# Patient Record
Sex: MALE | Race: WHITE | NOT HISPANIC OR LATINO | URBAN - METROPOLITAN AREA
[De-identification: names, ages, dates, MRNs, and addresses within clinical notes are randomized per-mention and may not be internally consistent; named-entity substitution may affect disease eponyms.]

---

## 2024-01-13 ENCOUNTER — OFFICE VISIT (OUTPATIENT)
Dept: URGENT CARE | Facility: CLINIC | Age: 24
End: 2024-01-13
Payer: COMMERCIAL

## 2024-01-13 VITALS
HEART RATE: 72 BPM | RESPIRATION RATE: 16 BRPM | TEMPERATURE: 99.6 F | DIASTOLIC BLOOD PRESSURE: 72 MMHG | OXYGEN SATURATION: 98 % | SYSTOLIC BLOOD PRESSURE: 128 MMHG

## 2024-01-13 DIAGNOSIS — J32.9 SINOBRONCHITIS: Primary | ICD-10-CM

## 2024-01-13 DIAGNOSIS — J40 SINOBRONCHITIS: Primary | ICD-10-CM

## 2024-01-13 PROCEDURE — 99203 OFFICE O/P NEW LOW 30 MIN: CPT | Performed by: FAMILY MEDICINE

## 2024-01-13 RX ORDER — AZITHROMYCIN 250 MG/1
TABLET, FILM COATED ORAL
Qty: 6 TABLET | Refills: 0 | Status: SHIPPED | OUTPATIENT
Start: 2024-01-13 | End: 2024-01-18

## 2024-01-13 ASSESSMENT — PAIN SCALES - GENERAL: PAINLEVEL: 0-NO PAIN

## 2024-01-13 NOTE — PATIENT INSTRUCTIONS
Drink plenty of fluids to help keep your mucus thin.  Apply moist heat (using a hot, damp towel) to your face for 5 to 10 minutes, several times a day.  Breathe warm, moist air from a steamy shower, a hot bath, or a sink filled with hot water. You can drink warm drinks such as tea.   Avoid extremely cool, dry air. Consider using a humidifier to increase the moisture in the air in your home.  Use saltwater nasal washes(saline irrigation) to help keep the nasal passages open and wash out mucus and bacteria.    You may use over-the-counter anti-inflammatory such as ibuprofen, Advil, Aleve for pain. For inflammation, ibuprofen doses include 400-600 mg 2-3 times per day. Tylenol is acceptable to use for pain.    Cough Suppressants or expectorants may be taken over-the-counter if you are having cough with your symptoms. These include Delsym, Robitussin-DM,    You will be started on antibiotic which will be sent to the pharmacy; please complete the regimen as directed even if your symptoms improve. It is recommended to take an Probiotic while on this medication to reduce symptoms of upset stomach, diarrhea.       Start taking a nasal steroid which may include: Flonase, Nasacort, or Nasonex and use as directed. These medications are now available over the counter.

## 2024-01-13 NOTE — PROGRESS NOTES
Subjective   Patient ID: Obed Herrera is a 23 y.o. male.    HPI    The following portions of the chart were reviewed this encounter and updated as appropriate:       Cough for the past 1.5 weeks.  No fever or chills.  No sick contacts.  No chest congestion.  Cough has been somewhat productive.  Started using cough medicine with some improvement.  No history of asthma or bronchitis.  Congestion in the nose.  No ear pain or headache.  No GI symptoms.  No other complaints or symptoms.  Review of Systems  Objective   Physical Exam  Constitutional: vital signs reviewed. Well developed, well nourished. patient alert and patient without distress.   Head and Face: Normal and atraumatic.    Ears, Nose, Mouth, and Throat:   Hearing: Normal.  External inspection of nose: Normal.   Lips, teeth, tongue and gums: Normal and well hydrated. External inspection of ears: Normal. Ear canals and TMs: Normal.  Posterior pharynx moist and with post nasal drip.  Nasal mucosa: Congested, inflamed  Neck: No neck mass was observed. Supple. normal muscle tone.   Cardiovascular: Heart rate normal, normal S1 and S2, no gallops, no murmurs and no pericardial rub. Rhythm: Normal.  Pulmonary: No respiratory distress. Palpation of chest: Normal. Clear bilateral breath sounds.   Lymphatic: No cervical lymphadenopathy  Psych: Normal mood and affect  Procedures    Assessment/Plan

## 2024-03-22 ENCOUNTER — OFFICE VISIT (OUTPATIENT)
Dept: URGENT CARE | Facility: CLINIC | Age: 24
End: 2024-03-22
Payer: COMMERCIAL

## 2024-03-22 VITALS
OXYGEN SATURATION: 96 % | BODY MASS INDEX: 27.37 KG/M2 | WEIGHT: 180 LBS | SYSTOLIC BLOOD PRESSURE: 116 MMHG | RESPIRATION RATE: 16 BRPM | TEMPERATURE: 98.5 F | DIASTOLIC BLOOD PRESSURE: 70 MMHG | HEART RATE: 70 BPM

## 2024-03-22 DIAGNOSIS — R30.0 DYSURIA: ICD-10-CM

## 2024-03-22 LAB
POC APPEARANCE, URINE: CLEAR
POC BILIRUBIN, URINE: ABNORMAL
POC BLOOD, URINE: NEGATIVE
POC COLOR, URINE: YELLOW
POC GLUCOSE, URINE: NEGATIVE MG/DL
POC KETONES, URINE: ABNORMAL MG/DL
POC LEUKOCYTES, URINE: NEGATIVE
POC NITRITE,URINE: NEGATIVE
POC PH, URINE: 6.5 PH
POC PROTEIN, URINE: ABNORMAL MG/DL
POC SPECIFIC GRAVITY, URINE: >=1.03
POC UROBILINOGEN, URINE: 1 EU/DL

## 2024-03-22 PROCEDURE — 81002 URINALYSIS NONAUTO W/O SCOPE: CPT | Performed by: PHYSICIAN ASSISTANT

## 2024-03-22 PROCEDURE — 87661 TRICHOMONAS VAGINALIS AMPLIF: CPT | Mod: 59 | Performed by: PHYSICIAN ASSISTANT

## 2024-03-22 PROCEDURE — 87086 URINE CULTURE/COLONY COUNT: CPT

## 2024-03-22 PROCEDURE — 99213 OFFICE O/P EST LOW 20 MIN: CPT | Performed by: PHYSICIAN ASSISTANT

## 2024-03-22 PROCEDURE — 1036F TOBACCO NON-USER: CPT | Performed by: PHYSICIAN ASSISTANT

## 2024-03-22 PROCEDURE — 87800 DETECT AGNT MULT DNA DIREC: CPT | Performed by: PHYSICIAN ASSISTANT

## 2024-03-22 ASSESSMENT — ENCOUNTER SYMPTOMS
DYSURIA: 1
FREQUENCY: 1
ABDOMINAL PAIN: 1

## 2024-03-22 ASSESSMENT — PAIN SCALES - GENERAL: PAINLEVEL: 5

## 2024-03-22 NOTE — PROGRESS NOTES
Subjective   Patient ID: Obed Herrera is a 24 y.o. male.    Patient is a 24-year-old male who complains of dysuria and urinary frequency that he has been experiencing for the past 3 days.  Patient also reports suprapubic cramping.  Patient denies fever, chills, flank pain, hematuria, nausea or other symptoms.  Patient denies urethral discharge or new rash to the skin of his genitalia.  Patient states he has no history of prostate issues and is experiencing no associated testicular pain.  Patient is extremely physically fit and has no history of diabetes.  Patient denies nausea, vomiting or diarrhea and states that he has no history of diverticulitis, irritable bowel syndrome or other GI conditions.  Patient also reports no history of kidney stone.      Difficulty Urinating  Presenting symptoms: dysuria    Associated symptoms: abdominal pain and urinary frequency    Abdominal Pain  Associated symptoms include dysuria and frequency.   The following portions of the chart were reviewed this encounter and updated as appropriate:       Review of Systems   Gastrointestinal:  Positive for abdominal pain.   Genitourinary:  Positive for dysuria and frequency.   All other systems reviewed and are negative.  Objective   Physical Exam  Vitals and nursing note reviewed.   Constitutional:       Appearance: Normal appearance. He is normal weight.   HENT:      Head: Normocephalic.      Nose: Nose normal.      Mouth/Throat:      Mouth: Mucous membranes are moist.      Pharynx: Oropharynx is clear.   Eyes:      Extraocular Movements: Extraocular movements intact.      Conjunctiva/sclera: Conjunctivae normal.      Pupils: Pupils are equal, round, and reactive to light.   Cardiovascular:      Rate and Rhythm: Normal rate.      Pulses: Normal pulses.      Heart sounds: Normal heart sounds.   Pulmonary:      Effort: Pulmonary effort is normal.      Breath sounds: Normal breath sounds.   Abdominal:      General: Abdomen is flat. Bowel  sounds are normal. There is no distension.      Palpations: Abdomen is soft.      Tenderness: There is no abdominal tenderness. There is no guarding or rebound.      Hernia: No hernia is present.   Skin:     General: Skin is warm and dry.      Capillary Refill: Capillary refill takes less than 2 seconds.   Neurological:      General: No focal deficit present.      Mental Status: He is alert and oriented to person, place, and time.   Psychiatric:         Mood and Affect: Mood normal.         Behavior: Behavior normal.         Thought Content: Thought content normal.         Judgment: Judgment normal.     Assessment/Plan   Physical exam findings as noted above.  Urinalysis is negative for leukocyte esterase, protein, blood or nitrite but 40 ketones are noted and specific gravity is 1.030.  Urine culture was ordered.  Urine RNA gonorrhea/chlamydia/trichomonas screen was ordered.  Patient was advised that he will be contacted with any positive results and that he should report to an emergency department if he notes any acute worsening of his symptoms.  Patient was advised that he should contact his primary care physician for further evaluation if his symptoms persist but do not worsen.  Patient verbalizes clear understanding of the above instructions.    CLINICAL IMPRESSION:  Dysuria;  Urinary Frequency    Diagnoses and all orders for this visit:  Dysuria  -     POCT UA (nonautomated) manually resulted  -     Urine Culture  -     C. Trachomatis / N. Gonorrhoeae, Amplified Detection  -     Trichomonas vaginalis, Nucleic Acid Detection      Patient disposition: Home

## 2024-03-23 LAB
C TRACH RRNA SPEC QL NAA+PROBE: NEGATIVE
N GONORRHOEA DNA SPEC QL PROBE+SIG AMP: NEGATIVE
T VAGINALIS RRNA SPEC QL NAA+PROBE: NEGATIVE

## 2024-03-24 LAB — BACTERIA UR CULT: NORMAL

## 2024-07-09 ENCOUNTER — OFFICE VISIT (OUTPATIENT)
Dept: URGENT CARE | Facility: CLINIC | Age: 24
End: 2024-07-09
Payer: COMMERCIAL

## 2024-07-09 VITALS
RESPIRATION RATE: 20 BRPM | OXYGEN SATURATION: 100 % | SYSTOLIC BLOOD PRESSURE: 146 MMHG | HEART RATE: 60 BPM | DIASTOLIC BLOOD PRESSURE: 84 MMHG | TEMPERATURE: 97.9 F

## 2024-07-09 DIAGNOSIS — L23.7 CONTACT DERMATITIS DUE TO POISON IVY: Primary | ICD-10-CM

## 2024-07-09 PROCEDURE — 99213 OFFICE O/P EST LOW 20 MIN: CPT | Performed by: PHYSICIAN ASSISTANT

## 2024-07-09 RX ORDER — PREDNISONE 20 MG/1
20 TABLET ORAL 2 TIMES DAILY
Qty: 6 TABLET | Refills: 0 | Status: SHIPPED | OUTPATIENT
Start: 2024-07-09 | End: 2024-07-12

## 2024-07-09 ASSESSMENT — PAIN SCALES - GENERAL: PAINLEVEL: 0-NO PAIN

## 2024-07-09 NOTE — PROGRESS NOTES
Subjective   Patient ID: Obed Herrera is a 24 y.o. male.    Patient is a 24-year-old male who complains of various areas of redness and itching secondary to contact with poison ivy that the patient has been experiencing for the past 3 days.  Patient has been taking Benadryl with no improvement in his symptoms.      The following portions of the chart were reviewed this encounter and updated as appropriate:       Review of Systems   Skin:  Positive for rash.   All other systems reviewed and are negative.  Objective   Physical Exam  Vitals and nursing note reviewed.   Constitutional:       Appearance: Normal appearance. He is normal weight.   HENT:      Head: Normocephalic and atraumatic.      Right Ear: External ear normal.      Left Ear: External ear normal.      Mouth/Throat:      Mouth: Mucous membranes are moist.      Pharynx: Oropharynx is clear.   Eyes:      Extraocular Movements: Extraocular movements intact.      Conjunctiva/sclera: Conjunctivae normal.      Pupils: Pupils are equal, round, and reactive to light.   Cardiovascular:      Pulses: Normal pulses.   Pulmonary:      Effort: Pulmonary effort is normal.      Breath sounds: Normal breath sounds.   Skin:     General: Skin is warm and dry.      Capillary Refill: Capillary refill takes less than 2 seconds.      Findings: Rash present.   Neurological:      General: No focal deficit present.      Mental Status: He is alert and oriented to person, place, and time.   Psychiatric:         Mood and Affect: Mood normal.         Behavior: Behavior normal.         Thought Content: Thought content normal.         Judgment: Judgment normal.     Assessment/Plan   Physical exam findings as noted above.  Patient was provided with a prescription for prednisone 20 mg and skin care instructions were discussed.  Patient verbalizes clear understanding of same.    CLINICAL IMPRESSION:  Contact Dermatitis Due to Poison [place]    Diagnoses and all orders for this  visit:  Contact dermatitis due to poison ivy  -     predniSONE (Deltasone) 20 mg tablet; Take 1 tablet (20 mg) by mouth 2 times a day for 3 days.    Patient disposition: Home